# Patient Record
Sex: MALE | Race: WHITE
[De-identification: names, ages, dates, MRNs, and addresses within clinical notes are randomized per-mention and may not be internally consistent; named-entity substitution may affect disease eponyms.]

---

## 2019-06-27 ENCOUNTER — HOSPITAL ENCOUNTER (OUTPATIENT)
Dept: HOSPITAL 56 - MW.SDS | Age: 69
Discharge: HOME | End: 2019-06-27
Attending: SURGERY
Payer: MEDICARE

## 2019-06-27 DIAGNOSIS — Z79.899: ICD-10-CM

## 2019-06-27 DIAGNOSIS — E78.00: ICD-10-CM

## 2019-06-27 DIAGNOSIS — G47.33: ICD-10-CM

## 2019-06-27 DIAGNOSIS — K62.1: ICD-10-CM

## 2019-06-27 DIAGNOSIS — K59.00: ICD-10-CM

## 2019-06-27 DIAGNOSIS — K63.5: ICD-10-CM

## 2019-06-27 DIAGNOSIS — Z88.7: ICD-10-CM

## 2019-06-27 DIAGNOSIS — K62.5: Primary | ICD-10-CM

## 2019-06-27 DIAGNOSIS — Z79.82: ICD-10-CM

## 2019-06-27 DIAGNOSIS — H40.9: ICD-10-CM

## 2019-06-27 DIAGNOSIS — Z88.0: ICD-10-CM

## 2019-06-27 NOTE — PCM.PREANE
Preanesthetic Assessment





- Anesthesia/Transfusion/Family Hx


Anesthesia History: Prior Anesthesia Without Reaction


Family History of Anesthesia Reaction: No


Transfusion History: No Prior Transfusion(s)





- Review of Systems


General: No Symptoms


Cardiovascular: No Symptoms


Gastrointestinal: No Symptoms


Neurological: No Symptoms


Other: Reports: None





- Physical Assessment


NPO Status Date: 06/26/19


Height: 6 ft 3 in


Weight: 102.512 kg


ASA Class: 2


Mental Status: Alert & Oriented x3


Airway Class: Mallampati = 2


Dentition: Reports: Normal Dentition


ROM/Head Extension: Full


Lungs: Clear to Auscultation, Normal Respiratory Effort


Cardiovascular: Regular Rate, Regular Rhythm





- Allergies


Allergies/Adverse Reactions: 


 Allergies











Allergy/AdvReac Type Severity Reaction Status Date / Time


 


Penicillins Allergy  unknown Verified 06/24/19 16:03


 


tetanus toxoid, adsorbed Allergy  Swelling Verified 06/24/19 16:03














- Blood


Blood Available: No





- Anesthesia Plan


Pre-Op Medication Ordered: None





- Acknowledgements


Anesthesia Type Planned: General Anesthesia


Pt an Appropriate Candidate for the Planned Anesthesia: Yes


Alternatives and Risks of Anesthesia Discussed w Pt/Guardian: Yes


Pt/Guardian Understands and Agrees with Anesthesia Plan: Yes


Additional Comments: 





PMH: remote hx of AI, prob mild, no clinical sx, KYAW, glaucoma


PLAN: tiva





PreAnesthesia Questionnaire


HEENT History: Reports: Glaucoma, Other (See Below)


Other HEENT History: wears glasses


Cardiovascular History: Reports: High Cholesterol, Other (See Below)


Other Cardiovascular History: hx of a "leaky" aortic valve


Gastrointestinal History: Reports: GERD





- Past Surgical History


Cardiovascular Surgical History: Reports: Other (See Below)


Other Cardiovascular Surgeries/Procedures: hx of angiogram- no stents


GI Surgical History: Reports: Colonoscopy





- SUBSTANCE USE


Smoking Status *Q: Never Smoker


Recreational Drug Use History: No





- HOME MEDS


Home Medications: 


 Home Meds





Aspirin [Adult Low Dose Aspirin EC] 81 mg PO DAILY 06/24/19 [History]


Dorzolamide HCl/Timolol Maleat [Dorzolamide-Timolol Eye Drops] 1 drop EYEBOTH 

BID 06/24/19 [History]


Lansoprazole [Prevacid] 30 mg PO DAILY 06/24/19 [History]


Latanoprost 1 drop EYEBOTH BEDTIME 06/24/19 [History]


Multivitamin [Daily Multiple Vitamin] 1 tab PO DAILY 06/24/19 [History]


Netarsudil Mesylate [Rhopressa] 1 drop EYEBOTH DAILY 06/24/19 [History]











- CURRENT (IN HOUSE) MEDS


Current Meds: 





 Current Medications





Lactated Ringer's (Ringers, Lactated)  1,000 mls @ 125 mls/hr IV ASDIRECTED CHELY


Sodium Chloride (Saline Flush)  10 ml FLUSH ASDIRECTED PRN


   PRN Reason: Keep Vein Open


Sodium Chloride (Saline Flush)  2.5 ml FLUSH ASDIRECTED PRN


   PRN Reason: Keep Vein Open


Sodium Chloride (Saline Flush)  10 ml FLUSH ASDIRECTED PRN


   PRN Reason: Keep Vein Open


Sodium Chloride (Saline Flush)  2.5 ml FLUSH ASDIRECTED PRN


   PRN Reason: Keep Vein Open


Sodium Chloride (Normal Saline)  10 ml IV ASDIRECTED PRN


   PRN Reason: IV Use

## 2019-06-27 NOTE — PCM.OPNOTE
- General Post-Op/Procedure Note


Date of Surgery/Procedure: 06/27/19


Operative Procedure(s): Colonoscopy


Findings: 


Cecal polyp, transverse colon polyp, rectal polyp, transverse colon polyp x 2


Pre Op Diagnosis: Screening colonoscopy


Post-Op Diagnosis: Cecal polyp, transverse colon polyp, rectal polyp, 

transverse colon polyp x 2


Anesthesia Technique: MAC


Primary Surgeon: Brenda Wagner


Condition: Good

## 2019-06-28 NOTE — OR
SURGEON:

BRENDA WAGNER MD

 

DATE OF PROCEDURE:  06/27/2019

 

PREOPERATIVE DIAGNOSIS:

Screening colonoscopy.

 

POSTOPERATIVE DIAGNOSES:

1. Cecal polyp.

2. Hepatic flexure polyp x2.

3. Transverse colon polyp.

4. Rectal polyp.

 

PROCEDURE PERFORMED:

Screening colonoscopy with biopsy.

 

PRIMARY SURGEON:

Brenda Wagner MD.

 

ANESTHESIA:

MAC.

 

INSTRUMENT USED:

Olympus colonoscope.

 

EXTENT OF EXAM:

To the cecum.

 

PREPARATION:

Good.

 

LIMITATIONS:

None.

 

INDICATION FOR EXAMINATION:

The patient is a 68-year-old male who presents for screening colonoscopy.  He

had one 10 years ago which was normal.  I explained the procedure, expected

perioperative course, and risks including bleeding, infection, or damage to

surrounding structures including perforation.  The patient verbalized

understanding and wishes to proceed.

 

PROCEDURE IN DETAIL:

The patient was brought into the endoscopy suite and placed in the left lateral

decubitus position.  A time-out was completed verifying the patient's name, age,

date of birth, allergies, and procedure to be performed.  Monitored anesthesia

care was induced and continuous oxygen was provided via nasal cannula throughout

the procedure.  After adequate sedation was achieved, a digital rectal exam was

performed.  This exam was within normal limits.  A well lubricated colonoscope

was inserted into the rectum and advanced under direct visualization to the

level of the cecum.  The cecum was identified by both visual and anatomic

landmarks.  A photograph was taken of the cecal cap as well as with the scope

retroflexed within the cecum.  The scope was then fully withdrawn while

examining the color, texture, anatomy, and integrity of the mucosa from the

cecum to the anal canal.  Proximal to the terminal ileum, a small cecal polyp

was noted.  This was removed using a cold biopsy forceps.  At the hepatic

flexure, 2 sessile polyps were noted.  These were removed in piecemeal fashion

using a cold biopsy forceps.  In the transverse colon, another polyp was noted.

This was removed in piecemeal fashion with a cold biopsy forceps.  The remainder

of the colon was normal.  The scope was then brought into the rectum.  In the

midportion of the rectum, 1 small polyp was noted and removed using a cold

biopsy forceps.  I then retroflexed the scope within the rectum to allow

visualization of the anal canal opening.  This appeared normal and a photograph

was taken.  Scope was then straightened out and fully withdrawn.  The cecum to

anus time was 30 minutes.  The patient tolerated the procedure well and was

taken to PACU in stable condition.

 

ENDOSCOPIC DIAGNOSES:

1. Cecal polyp.

2. Hepatic flexure polyp x2.

3. Transverse colon polyp.

4. Rectal polyp.

 

RECOMMENDATIONS:

Follow up in clinic in 2 weeks.

 

 

GAMA DONOHUE

DD:  06/28/2019 09:57:22

DT:  06/28/2019 11:57:30

Job #:  [id number]